# Patient Record
Sex: FEMALE | Race: WHITE
[De-identification: names, ages, dates, MRNs, and addresses within clinical notes are randomized per-mention and may not be internally consistent; named-entity substitution may affect disease eponyms.]

---

## 2018-11-30 ENCOUNTER — HOSPITAL ENCOUNTER (EMERGENCY)
Dept: HOSPITAL 88 - FSED | Age: 73
Discharge: HOME | End: 2018-11-30
Payer: COMMERCIAL

## 2018-11-30 VITALS — WEIGHT: 157 LBS | BODY MASS INDEX: 27.82 KG/M2 | HEIGHT: 63 IN

## 2018-11-30 DIAGNOSIS — R05: ICD-10-CM

## 2018-11-30 DIAGNOSIS — I10: ICD-10-CM

## 2018-11-30 DIAGNOSIS — J18.9: ICD-10-CM

## 2018-11-30 DIAGNOSIS — R50.9: Primary | ICD-10-CM

## 2018-11-30 PROCEDURE — 99284 EMERGENCY DEPT VISIT MOD MDM: CPT

## 2018-11-30 PROCEDURE — 71046 X-RAY EXAM CHEST 2 VIEWS: CPT

## 2018-11-30 PROCEDURE — 87400 INFLUENZA A/B EACH AG IA: CPT

## 2018-11-30 NOTE — XMS REPORT
Summary of Care: 3/14/18 - 3/14/18

                             Created on: 2089



FRANCESCO NAVARRETE

External Reference #: [id number]

: 1945

Sex: Female



Demographics







                          Address                   [address] [place] [address]  [address]

 

                          Home Phone                [phone]

 

                          Preferred Language        English

 

                          Marital Status            

 

                          Evangelical Affiliation     None

 

                          Race                      White

 

                                        Additional Race(s)  

 

                          Ethnic Group              Non-





Author







                          Author                    [facility]

 

                          Organization              [facility]

 

                          Address                   Unknown

 

                          Phone                     Unavailable







Encounter





WILY Hoyos(CAYDEN) [id number] Date(s): 3/14/18 - 3/14/18

[place] [address] [facility], [address] [place] 
[address] [phone]

Discharge Disposition: Home or Self Care

Attending Physician: Marizol Kunz MD





Vital Signs







 



                           Most recent to            1



                                         oldest [Reference 



                                         Range]: 

 

 



                           Height                    160.02 cm



                                         (3/14/18 2:09 PM)

 

 



                           Temperature Oral          98.2 DegF



                           [96.4-99.1 DegF]          (3/14/18 2:09 PM)

 

 



                           Blood Pressure            160/74 mmHg



                           [/60-90 mmHg]       *HI*



                                         (3/14/18 2:09 PM)

 

 



                           Peripheral Pulse          78 bpm



                           Rate [ bpm]         (3/14/18 2:09 PM)

 

 



                           Weight                    71.563 kg



                                         (3/14/18 2:09 PM)

 

 



                           Body Mass Index           27.95 m2



                                         (3/14/18 2:09 PM)







Problem List







    



              Condition     Effective Dates     Status       Health Status     Informant

 

    



                     Acute bronchitis1, 2     08            Resolved  

 

    



                     Acute maxillary     08            Resolved  



                                         sinusitis3, 4    

 

    



                     Acute otitis media5,     13              Resolved  



                                         6    

 

    



                     Acute sinusitis7, 8     9/3/13              Resolved  

 

    



                     Acute upper         2/3/12              Resolved  



                                         respiratory    



                                         infection9, 10    

 

    



                     Allergic finccnlx75     13              Active  

 

    



                     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  



                                         secnzvnlihux75    

 

    



                     Contact             4/28/15             Active  



                                         wpvmukaxak28, 14    

 

    



                     Hip pain15          13              Active  

 

    



                     Knee pain16         13              Active  

 

    



                     Mammography         08              Active  



                                         wbgiwyid53    

 

    



                     Mixed               1/10/11             Active  



                                         hjyakgbxuupnvz85    

 

    



                     Lmilteyguw65        1/10/11             Active  

 

    



                     Pain in lower limb20     13              Active  

 

    



                     Screening for       7/30/15             Active  



                                         malignant neoplasm    



                                         of zkrowv11    

 

    



                     Vitamin D           7/30/15             Active  



                                         zckihhctsu64    







1Data migrated from [employer] on 7/18/15.



2Data migrated from [employer] on 7/17/15.



3Data migrated from [employer] on 7/18/15.



4Data migrated from [employer] on 7/17/15.



5Data migrated from [employer] on 7/18/15.



6Data migrated from [employer] on 7/17/15.



7Data migrated from [employer] on 7/18/15.



8Data migrated from [employer] on 7/17/15.



9Data migrated from [employer] on 7/18/15.



10Data migrated from [employer] on 7/17/15.



11Data migrated from [employer] on 5/30/15.



12Data migrated from [employer] on 5/30/15.



13Data migrated from [employer] on 7/8/15.



14Data migrated from [employer] on 7/8/15.



15Data migrated from [employer] on 5/30/15.



16Data migrated from [employer] on 5/30/15.



17Data migrated from [employer] on 5/30/15.



18Data migrated from [employer] on 5/30/15.



19Data migrated from [employer] on 5/30/15.



20Data migrated from [employer] on 5/30/15.



21Data migrated from [employer] on 8/22/15.



22Data migrated from [employer] on 8/22/15.



Allergies, Adverse Reactions, Alerts







   



                 Substance       Reaction        Severity        Status

 

   



                           penicillins1              Active

 

   



                           NKFA                      Active







1Data migrated from [employer] on 7/30/15. Originally documented as PCN. 
HIVES



Medications





atenolol 25 mg oral tablet

25 mg=1 tab, PO, Daily, # 90 tab, 1 Refill(s), [facility] [id number]

Start Date: 3/14/18

Stop Date: 9/10/18

Status: Ordered



Results





No data available for this section



Immunizations





No data available for this section



Procedures





No data available for this section



Social History







 



                           Social History Type       Response

 

 



                           Smoking Status            Never smoker; Ready to change: No; Concerns about tobacco use 

in household:



                                         No; Exposure to Tobacco Smoke None; Cigarette Smoking Last 365 Days No; Reg



                                         Smoking Cessation Counseling No



                                         entered on: 3/14/18







Assessment and Plan





No data available for this section

## 2018-11-30 NOTE — XMS REPORT
Summary of Care: 14 - 14

                             Created on: 10/09/2031



FRANCESCO NAVARRETE

External Reference #: [id number]

: 1945

Sex: Female



Demographics







                          Address                   [address] [place] [address]  [address]

 

                          Home Phone                [phone]

 

                          Preferred Language        English

 

                          Marital Status            

 

                          Christian Affiliation     Unknown

 

                          Race                      White/

 

                          Ethnic Group              Non-





Author







                          Organization              Unknown

 

                          Address                   Unknown

 

                          Phone                     Unavailable







Encounter





HQ Encntr_damaso(CAYDEN) [id number] Date(s): 14 - 14

[facility] [address] [address]

Discharge Disposition: Home

Physician Attending: Dione Villela MD

Physician_Referring: Dione Villela MD





Reason for Visit





SCREENING MAMMO



Problem List





No data available for this section



Allergies, Adverse Reactions, Alerts







   



                 Substance       Reaction        Severity        Status

 

   



                           NKDA                      Active







Medications





No data available for this section



Medications Administered During Your Visit





No data available for this section



Immunizations





No data available for this section

## 2018-11-30 NOTE — DIAGNOSTIC IMAGING REPORT
PROCEDURE:CXR 2 VIEW - HOPD

 

COMPARISON:None.

 

INDICATIONS:cough congestion

 

FINDINGS:The lungs are well-inflated. Mild biapical 

pleural-parenchymal scar. Calcified granuloma in the left midlung. Mild 

prominence of the pulmonary interstitium.

 

No focal airspace consolidation, pleural effusion, or pneumothorax. 

Tortuosity of the thoracic aorta with atherosclerotic calcification. 

Normal heart size. No overt pulmonary edema.

 

No acute osseous abnormality.

 

 

 

CONCLUSION:

 

Mild prominence of the pulmonary interstitium may indicate age-related 

fibrotic changes, though atypical pneumonia is an additional 

consideration in the setting of cough and congestion. 

 

Dictated by:  Mehrdad Valencia M.D. on 11/30/2018 at 10:28     

Electronically approved by:  Mehrdad Valencia M.D. on 11/30/2018 at 10:28

## 2018-11-30 NOTE — XMS REPORT
Summary of Care: 8/28/15 - 8/28/15

                             Created on: 2090



NAVARRETEFRANCESCO

External Reference #: [id number]

: 1945

Sex: Female



Demographics







                          Address                   [address] [place] [address]  [address]

 

                          Home Phone                [phone]

 

                          Preferred Language        English

 

                          Marital Status            

 

                          Scientology Affiliation     None

 

                          Race                      White/

 

                          Ethnic Group              Non-





Author







                          Author                    [facility]

 

                          Organization              [facility]

 

                          Address                   Unknown

 

                          Phone                     Unavailable







Encounter





WILY Hoyos(FIN) [id number] Date(s): 8/28/15 - 8/28/15

[facility] [address] [place] [address]-     [phone]
[phone]

Discharge Disposition: Home

Attending Physician: Edson Darling MD

Referring Physician: Edson Darling MD





Vital Signs





No data available for this section



Problem List







    



              Condition     Effective Dates     Status       Health Status     Informant

 

    



                     Acute bronchitis1, 2     08            Resolved  

 

    



                     Acute maxillary     08            Resolved  



                                         sinusitis3, 4    

 

    



                     Acute otitis media5,     13              Resolved  



                                         6    

 

    



                     Acute sinusitis7, 8     9/3/13              Resolved  

 

    



                     Acute upper         2/3/12              Resolved  



                                         respiratory    



                                         infection9, 10    

 

    



                     Allergic hzfcwvbb34     13              Active  

 

    



                     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  



                                         kakdgasnsvrm94    

 

    



                     Contact             4/28/15             Active  



                                         onmdddfpfn66, 14    

 

    



                     Hip pain15          13              Active  

 

    



                     Knee pain16         13              Active  

 

    



                     Mammography         08              Active  



                                         nrxrbunj18    

 

    



                     Mixed               1/10/11             Active  



                                         pgbusxplcncyic10    

 

    



                     Szfktsfdpg34        1/10/11             Active  

 

    



                     Pain in lower limb20     13              Active  

 

    



                     Screening for       7/30/15             Active  



                                         malignant neoplasm    



                                         of nkfvfk22    

 

    



                     Vitamin D           7/30/15             Active  



                                         iarpfulkos58    







1Data migrated from [employer] on 7/18/15.



2Data migrated from [employer] on 7/17/15.



3Data migrated from [employer] on 7/18/15.



4Data migrated from [employer] on 7/17/15.



5Data migrated from [employer] on 7/18/15.



6Data migrated from [employer] on 7/17/15.



7Data migrated from [employer] on 7/18/15.



8Data migrated from [employer] on 7/17/15.



9Data migrated from [employer] on 7/18/15.



10Data migrated from [employer] on 7/17/15.



11Data migrated from [employer] on 5/30/15.



12Data migrated from [employer] on 5/30/15.



13Data migrated from [employer] on 7/8/15.



14Data migrated from [employer] on 7/8/15.



15Data migrated from [employer] on 5/30/15.



16Data migrated from [employer] on 5/30/15.



17Data migrated from [employer] on 5/30/15.



18Data migrated from [employer] on 5/30/15.



19Data migrated from [employer] on 5/30/15.



20Data migrated from [employer] on 5/30/15.



21Data migrated from [employer] on 8/22/15.



22Data migrated from [employer] on 8/22/15.



Allergies, Adverse Reactions, Alerts







   



                 Substance       Reaction        Severity        Status

 

   



                           NKDA                      Active

 

   



                           penicillins1              Active







1Data migrated from [employer] on 7/30/15. Originally documented as PCN. 
HIVES



Medications





No data available for this section



Results





No data available for this section



Immunizations





No data available for this section



Procedures





No data available for this section



Social History





No data available for this section



Assessment and Plan





No data available for this section

## 2018-11-30 NOTE — XMS REPORT
Summary of Care: 16 - 16

                             Created on: 2030



NAVARRETEFRANCESCO

External Reference #: [id number]

: 1945

Sex: Female



Demographics







                          Address                   [address] [place]  [address]-

 

                          Home Phone                [phone]

 

                          Preferred Language        English

 

                          Marital Status            

 

                          Restorationist Affiliation     None

 

                          Race                      White/

 

                          Ethnic Group              Non-





Author







                          Author                    [facility]

 

                          Organization              [facility]

 

                          Address                   Unknown

 

                          Phone                     Unavailable







Encounter





WILY Hoyos(CAYDEN) [id number] Date(s): 16 - 16

[facility] [address] [place] [address]-     [phone]
[phone]

Discharge Disposition: Home or Self Care

Attending Physician: Edson Darling MD

Referring Physician: Edson Darling MD





Vital Signs





No data available for this section



Problem List







    



              Condition     Effective Dates     Status       Health Status     Informant

 

    



                     Acute bronchitis1, 2     08            Resolved  

 

    



                     Acute maxillary     08            Resolved  



                                         sinusitis3, 4    

 

    



                     Acute otitis media5,     13              Resolved  



                                         6    

 

    



                     Acute sinusitis7, 8     9/3/13              Resolved  

 

    



                     Acute upper         2/3/12              Resolved  



                                         respiratory    



                                         infection9, 10    

 

    



                     Allergic mionplpx78     13              Active  

 

    



                     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  



                                         krapuiupwxdd44    

 

    



                     Contact             4/28/15             Active  



                                         nqdbcyuxsz44, 14    

 

    



                     Hip pain15          13              Active  

 

    



                     Knee pain16         13              Active  

 

    



                     Mammography         08              Active  



                                         ktcrunpl48    

 

    



                     Mixed               1/10/11             Active  



                                         lmqybinubfoxam18    

 

    



                     Fjccsnvkqp19        1/10/11             Active  

 

    



                     Pain in lower limb20     13              Active  

 

    



                     Screening for       7/30/15             Active  



                                         malignant neoplasm    



                                         of vkwjsu22    

 

    



                     Vitamin D           7/30/15             Active  



                                         wntwjocmnu77    







1Data migrated from [employer] on 7/18/15.



2Data migrated from [employer] on 7/17/15.



3Data migrated from [employer] on 7/18/15.



4Data migrated from [employer] on 7/17/15.



5Data migrated from [employer] on 7/18/15.



6Data migrated from [employer] on 7/17/15.



7Data migrated from [employer] on 7/18/15.



8Data migrated from [employer] on 7/17/15.



9Data migrated from [employer] on 7/18/15.



10Data migrated from [employer] on 7/17/15.



11Data migrated from [employer] on 5/30/15.



12Data migrated from [employer] on 5/30/15.



13Data migrated from [employer] on 7/8/15.



14Data migrated from [employer] on 7/8/15.



15Data migrated from [employer] on 5/30/15.



16Data migrated from [employer] on 5/30/15.



17Data migrated from [employer] on 5/30/15.



18Data migrated from [employer] on 5/30/15.



19Data migrated from [employer] on 5/30/15.



20Data migrated from [employer] on 5/30/15.



21Data migrated from [employer] on 8/22/15.



22Data migrated from [employer] on 8/22/15.



Allergies, Adverse Reactions, Alerts







   



                 Substance       Reaction        Severity        Status

 

   



                           NKDA                      Active

 

   



                           penicillins1              Active







1Data migrated from [employer] on 7/30/15. Originally documented as PCN. 
HIVES



Medications





No data available for this section



Results





No data available for this section



Immunizations





No data available for this section



Procedures





No data available for this section



Social History







 



                           Social History Type       Response

 

 



                           Smoking Status            Never smoker; Exposure to Tobacco Smoke None; Cigarette Smoking

 Last 365



                                         Days No; Reg Smoking Cessation Counseling No







Assessment and Plan





No data available for this section

## 2018-11-30 NOTE — XMS REPORT
Summary of Care: 17 - 17

                             Created on: 2038



FRANCESCO NAVARRETE

External Reference #: [id number]

: 1945

Sex: Female



Demographics







                          Address                   [address] [place]  [address]-

 

                          Home Phone                [phone]

 

                          Preferred Language        English

 

                          Marital Status            

 

                          Shinto Affiliation     None

 

                          Race                      White/

 

                          Ethnic Group              Non-





Author







                          Author                    [facility]

 

                          Organization              [facility]

 

                          Address                   Unknown

 

                          Phone                     Unavailable







Encounter





WILY Hoyos(CAYDEN) [id number] Date(s): 17 - 17

[facility] [address] [place] [address]-     [phone]
[phone]

Discharge Disposition: Home or Self Care

Attending Physician: Marizol Kunz MD

Referring Physician: Marizol Kunz MD





Vital Signs





No data available for this section



Problem List







    



              Condition     Effective Dates     Status       Health Status     Informant

 

    



                     Acute bronchitis1, 2     08            Resolved  

 

    



                     Acute maxillary     08            Resolved  



                                         sinusitis3, 4    

 

    



                     Acute otitis media5,     13              Resolved  



                                         6    

 

    



                     Acute sinusitis7, 8     9/3/13              Resolved  

 

    



                     Acute upper         2/3/12              Resolved  



                                         respiratory    



                                         infection9, 10    

 

    



                     Allergic dhnssbou45     13              Active  

 

    



                     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  



                                         [id number]    

 

    



                     Contact             4/28/15             Active  



                                         eiwnrtmpay51, 14    

 

    



                     Hip pain15          13              Active  

 

    



                     Knee pain16         13              Active  

 

    



                     Mammography         08              Active  



                                         xllhwcty55    

 

    



                     Mixed               1/10/11             Active  



                                         tasnscwrfdfdow62    

 

    



                     Xeeedmxkru83        1/10/11             Active  

 

    



                     Pain in lower limb20     13              Active  

 

    



                     Screening for       7/30/15             Active  



                                         malignant neoplasm    



                                         of hshjid75    

 

    



                     Vitamin D           7/30/15             Active  



                                         uogkoutbii23    







1Data migrated from [employer] on 7/18/15.



2Data migrated from [employer] on 7/17/15.



3Data migrated from [employer] on 7/18/15.



4Data migrated from [employer] on 7/17/15.



5Data migrated from [employer] on 7/18/15.



6Data migrated from [employer] on 7/17/15.



7Data migrated from [employer] on 7/18/15.



8Data migrated from [employer] on 7/17/15.



9Data migrated from [employer] on 7/18/15.



10Data migrated from [employer] on 7/17/15.



11Data migrated from [employer] on 5/30/15.



12Data migrated from [employer] on 5/30/15.



13Data migrated from [employer] on 7/8/15.



14Data migrated from [employer] on 7/8/15.



15Data migrated from [employer] on 5/30/15.



16Data migrated from [employer] on 5/30/15.



17Data migrated from [employer] on 5/30/15.



18Data migrated from [employer] on 5/30/15.



19Data migrated from [employer] on 5/30/15.



20Data migrated from [employer] on 5/30/15.



21Data migrated from [employer] on 8/22/15.



22Data migrated from [employer] on 8/22/15.



Allergies, Adverse Reactions, Alerts







   



                 Substance       Reaction        Severity        Status

 

   



                           NKFA                      Active

 

   



                           penicillins1              Active







1Data migrated from [employer] on 7/30/15. Originally documented as PCN. 
HIVES



Medications





No data available for this section



Results





No data available for this section



Immunizations





No data available for this section



Procedures





No data available for this section



Social History







 



                           Social History Type       Response

 

 



                           Smoking Status            Never smoker; Exposure to Tobacco Smoke None; Cigarette Smoking

 Last 365



                                         Days No; Reg Smoking Cessation Counseling No







Assessment and Plan





No data available for this section

## 2018-11-30 NOTE — XMS REPORT
Continuity of Care Document

                             Created on: 10/17/2018



FRANCESCO NAVARRETE

External Reference #: [id number]

: 1945

Sex: Female



Demographics







                          Address                   [address] [place] [address]  [id number]

 

                          Home Phone                [phone]

 

                          Preferred Language        Unknown

 

                          Marital Status            Unknown

 

                          Scientology Affiliation     Unknown

 

                          Race                      Unknown

 

                          Ethnic Group              Unknown





Author







                          Author                    [facility]              Interface

 

                          Address                   Unknown

 

                          Phone                     Unavailable



                                                    



Problems

                    





                    Problem                            Status                            Onset Date     

                          Classification                            Date Reported       

                          Comments                            Source                    

 

                    ROUTINE                            Active                            10/16/2017     

                                                                                       

                                        [facility]                    

 

                          SCREENING  **LAST MMG W-**                            Active                    

                    2016                                                                     

                                                      [facility]                    

 

                    SCREENING/ OSTEPENIA                            Active                            2015

                                                                                       

                                        [facility]                    

 

                          Vitamin D deficiency<sup>22</sup>                            Active               

                    2015                            Problem                            2018

                                        Data migrated from [employer] on 8/22/15.           

                                        [facility]                    

 

                          Contact dermatitis<sup>13, 14</sup>                            Active             

                          2015                            Problem                         

                          2018                            Data migrated from [employer] on 7/8/15. 

                                        [facility]                    

 

                    SCREENING MAMMO                            Active                            2014

                                                                                       

                                        [facility]                    

 

                          Acute sinusitis<sup>7, 8</sup>                            Resolved                

                    2013                            Problem                            2018

                                        Data migrated from [employer] on 7/17/15.           

                                        [facility]                    

 

                    Hip pain<sup>15</sup>                            Active                            2013

                            Problem                            2018            

                                        Data migrated from [employer] on 5/30/15.                       

                                        [facility]                    

 

                    Knee pain<sup>16</sup>                            Active                            

2013                            Problem                            2018  

                                        Data migrated from [employer] on 5/30/15.             

                                        [facility]                    

 

                          Pain in lower limb<sup>20</sup>                            Active                 

                    2013                            Problem                            2018

                                        Data migrated from [employer] on 5/30/15.           

                                        [facility]                    

 

                          Acute otitis media<sup>5, 6</sup>                            Resolved             

                          2013                            Problem                         

                          2018                            Data migrated from [employer] on 7/17/15.

                                        [facility]                    

 

                          Allergic rhinitis<sup>11</sup>                            Active                  

                    2013                            Problem                            2018

                                        Data migrated from [employer] on 5/30/15.           

                                        [facility]                    

 

                          Acute upper respiratory infection<sup>9, 10</sup>                            Resolved

                            2012                            Problem            

                          2018                            Data migrated from [employer]

 on 7/17/15.                            [facility]              

      

 

                          Benign essential hypertension<sup>12</sup>                            Active      

                          01/10/2011                            Problem                  

                          2018                            Data migrated from [employer] on 5/30/15.

                                        [facility]                    

 

                          Mixed hyperlipidemia<sup>18</sup>                            Active               

                    01/10/2011                            Problem                            2018

                                        Data migrated from [employer] on 5/30/15.           

                                        [facility]                    

 

                          Osteopenia<sup>19</sup>                            Active                         

                    01/10/2011                            Problem                            2018 

                                        Data migrated from [employer] on 5/30/15.            

                                        [facility]                    

 

                          Acute bronchitis<sup>1, 2</sup>                            Resolved               

                    2008                            Problem                            2018

                                        Data migrated from [employer] on 7/17/15.           

                                        [facility]                    

 

                          Acute maxillary sinusitis<sup>3, 4</sup>                            Resolved      

                          2008                            Problem                  

                          2018                            Data migrated from [employer] on 7/17/15.

                                        [facility]                    

 

                          Mammography abnormal<sup>17</sup>                            Active               

                    2008                            Problem                            2018

                                        Data migrated from [employer] on 5/30/15.           

                                        [facility]                    

 

                    SCREEN MAMMOGRAM NEC                            Active                              

                                                                                       

                                        [facility]                    

 

                    Datatype(DG1.4)-                            Active                                  

                                                                                       

                                        [facility]                    

 

                          ENCNTR SCREEN MAMMOGRAM FOR MALIGNANT NE                            Active        

                                                                                       

                                                      [facility]                    



                                                                                
                                                                                
                                                                                
                                                                                
                                                                                
                   



Medications

                    





                    Medication                            Details                            Route      

                          Status                            Patient Instructions         

                          Ordering Provider                            Order Date           

                                        Source                    

 

                          Atenolol 25 MG Oral Tablet                            25 mg=1 tab, PO, Daily, # 90

 tab, 1 Refill(s), Pharmacy: [id number]                                  

                      Active                                                           

                          2018                            [facility]      

              



                                                                                
       



Allergies, Adverse Reactions, Alerts

                    





                    Substance                            Category                            Reaction   

                          Severity                            Reaction type           

                          Status                            Date Reported                     

                          Comments                            Source                    

 

                          penicillins<sup>1</sup>                            Assertion                      

                                                                            Drug allergy             

                    Active                            2007                            

Data migrated from [employer] on 7/30/15. Originally documented as PCN. HIVES
                                        [facility]                    

 

                    NKFA                            Assertion                                           

                                                Drug allergy                            Active

                                                                                     

[facility]                    



                                                                                



Immunizations

                    





                    Immunization                            Date Given                            Site  

                          Status                            Last Updated             

                          Comments                            Source                    



                                                                        



Results

                    





                    Order Name                            Results                            Value      

                          Reference Range                            Date                

                          Interpretation                            Comments                       

                                        Source                    

 

                          Breast Mammo Scrn SUPA w alexia incl CAD MA                            Breast Mammo Scrn

 SUPA w alexia incl CAD MA                         





BILATERAL DIGITAL SCREENING MAMMOGRAM 3D/2D WITH CAD: 2017





CLINICAL: /[employer].  





Current study was evaluated with a Computer Aided Detection (CAD) system.  



COMPARISON:Comparison is made to exams dated:  2016 mammogram, 2015 
mammogram, 2014 mammogram, 2013 mammogram, and 2013 mammogram - 
[facility].   



TECHNIQUE: Digital Breast Tomosynthesis was performed and utilized for 
Interpretation. Molecule Syntha Version 1.3 was utilized for computer aided detection.  
There are scattered fibroglandular densities in both breasts.  



FINDINGS: 

There are benign appearing vascular calcifications and calcifications in the 
right breast.  There also are benign appearing vascular calcifications, 
calcifications, and densities in the left breast.  

No significant masses, calcifications, or other findings are seen in either 
breast.  

There has been no significant interval change.





IMPRESSION: BENIGN



RECOMMENDATION:There is no mammographic evidence of malignancy. A 1 year 
screening mammogram is recommended.(2018)   This exam was interpreted at 
[id number] for [facility].  



Lauro flood/penrad:2017 15:56:01  



Imaging Technologist(s): Barbara Villeda [facility]

letter sent: BI-RADS 1/2  

Mammogram BI-RADS: 2 Benign

                                                          2017                 

                                                      -

                                        -





Read by:  Lauro Crouch MD

Dictated Date/time:  17 15:56

Electronically Signed by:  Lauro Crouch MD                           17 
15:56

FINAL REPORT

                                        [facility]                    

 

                          Digital Mammo Screen Supa MA w alexia                            Digital Mammo Screen

 Supa MA w alexia                             - DIGITAL MAMMO SCREEN SUPA MA W ALEXIA

BILATERAL DIGITAL SCREENING MAMMOGRAM 3D/2D WITH CAD: 2016

CLINICAL: Screen.  



                                        2D digital mammographic images and 3D digital tomosynthesis images were obtained

 in the CC and MLO projections.  

Current study was evaluated with a Computer Aided Detection (CAD) system.  

Comparison is made to exams dated:  2015 mammogram, 2014 mammogram, 
2013 mammogram, 2013 mammogram, 3/21/2011 mammogram and 2010 
mammogram - [facility].  

There are scattered fibroglandular densities in both breasts.  

There are benign appearing vascular calcifications and calcifications in both 
breasts.  There also are benign appearing densities in the left breast.  

No significant masses, calcifications, or other findings are seen in either 
breast.  

There has been no significant interval change.



IMPRESSION: BENIGN

There is no mammographic evidence of malignancy.  A 1 year screening mammogram 
is recommended. 



Lauro flood/penrad:2016 15:02:53  



Imaging Technologist: Sagrario Page, [facility]

This exam was dictated and interpreted by [id number] for [facility].  

letter sent: Normal exam  

Mammogram BI-RADS: 2 Benign

                                                          2016                 

                                                      -

                                        -





Read by:  Lauro Crouch MD

Dictated Date/time:  16 15:02

Electronically Signed by:  Lauro Crouch MD                           16 
15:02

FINAL REPORT

                                        [facility]                    

 

                          Bone Density Scan                            Bone Density Scan                    

                                        DEXA BONE DENSITY

 

TECHNIQUE: DEXA bone density evaluation was performed on a Hologic scanner. This
represents the patient's initial evaluation at this facility.

 

FINDINGS:

 

Total lumbar spine bone density is 0.912 g/sq cm, 1.2 standard deviations below 
normal (T score -1.2). This represents osteopenia.

 

Total hip bone density is 0.996 g/sq cm, 0.4 standard deviations above normal (T
score 0.4).  Bone mineral density of the left femoral neck is 0.727 g/sq cm, 1.1
standard deviations below normal (T score -1.1). This represents osteopenia. 

 

IMPRESSION:

 

Bone mineral density is consistent with osteopenia and increased fracture risk.

 

FOR YOUR INFORMATION:

 

The World Health Organization has established that OSTEOPOROSIS occurs at -2.5 
or more standard deviations (SD) below peak bone mass (T-score on the Hologic 
report). OSTEOPENIA (low bone mass) occurs between -1.0 to -2.5 standard 
deviations below peak bone mass.

 

 

 

 

SL: 16

                                                          2015                 

                                                      -

                                        -





Read by:  Phuc Hawkins MD

Dictated Date/time:  08/28/15 11:45

Electronically Signed by:  Phuc Hawkins MD                        08/28/15 
11:46

FINAL REPORT

                                        [facility]                    

 

                          Digital Mammo Screening Supa MA                            Digital Mammo Screening 

[facility]                                   - DIGITAL MAMMO SCREENING SUPA MA

BILATERAL DIGITAL SCREENING MAMMOGRAM WITH CAD: 2015

CLINICAL: Routine.  



Current study was evaluated with a Computer Aided Detection (CAD) system.  

Comparison is made to exams dated:  2014 mammogram, 2013 mammogram, 
2013 mammogram, 3/21/2011 mammogram, 2010 mammogram and 3/24/2010 
mammogram - [facility].  

There are scattered fibroglandular densities in both breasts.  

There are benign appearing vascular calcifications and calcifications in both 
breasts.  There also are benign appearing densities in the left breast.  

No significant masses, calcifications, or other findings are seen in either 
breast.  

There has been no significant interval change.



IMPRESSION: BENIGN

There is no mammographic evidence of malignancy.  A 1 year screening mammogram 
is recommended. 



Lauro flood/bonny:2015 07:53:35  



Imaging Technologist: Nanci Abdalla, [facility]

This exam was dictated and interpreted by [id number] for [facility].  

letter sent: Normal exam  

Mammogram BI-RADS: 2 Benign

                                                          2015                 

                                                      -

                                        -





Read by:  Lauro Crouch MD

Dictated Date/time:  08/31/15 07:53

Electronically Signed by:  Lauro Crouch MD                           08/31/15 
07:53

FINAL REPORT

                                        [facility]                    

 

                          Digital Mammo Screening Supa MA                            Digital Mammo Screening 

[facility]                                   - DIGITAL MAMMO SCREENING SUPA MA

BILATERAL DIGITAL SCREENING MAMMOGRAM WITH CAD: 2014

CLINICAL: Other Screening Mammogram.  



Current study was evaluated with a Computer Aided Detection (CAD) system.  

Comparison is made to exams dated:  2013 mammogram, 2013 mammogram, 
3/21/2011 mammogram, 2010 mammogram, 3/24/2010 mammogram and 3/20/2009 
mammogram - [facility].  

There are scattered fibroglandular densities in both breasts.  

Multiple benign appearing densities are noted bilaterally which are not 
significantly changed in size possibly representing cysts.  

There are benign appearing vascular calcifications and calcifications in both 
breasts.  

No significant masses, calcifications, or other findings are seen in either 
breast.  

There has been no significant interval change.



IMPRESSION: BENIGN

Multiple benign appearing bilateral densities are not significantly changed 
possibly representing cysts. Ultrasound can be performed for confirmation and to
exclude underlying pathology.  

There is no mammographic evidence of malignancy.  A screening mammogram in one 
year is recommended. 



Lauro flood/bonny:2014 07:50:53  



Imaging Technologist: Nanci Abdalla, [facility]

This exam was dictated and interpreted by [id number] for [facility].  

letter sent: Normal exam  

Mammogram BI-RADS: 2 Benign

                                                          2014                 

                                                      -

                                        -





Read by:  Lauro Crouch MD

Dictated Date/time:  14 07:50

Electronically Signed by:  Lauro Crouch MD                           14 
07:50

FINAL REPORT

                                        [facility]                    



                                                                                
                                                                               



Vital Signs

                    





                    Vital Sign                            Value                            Date         

                          Comments                            Source                    

 

                    BMI Calculated                            27.95                             2018

                                                         [facility]             

       

 

                    Height                            160.02 cm                            2018   

                                                       [facility]                

    

 

                    Heart Rate                            78                             2018     

                                                       [facility]                  

  

 

                    Temperature Oral (F)                            98.2 F                            2018

                                                         [facility]             

       

 

                    Weight                            71.563                             2018     

                                                       [facility]                  

  

 

                    Systolic (mm Hg)                            160                             2018

                                                         [facility]             

       

 

                    Diastolic (mm Hg)                            74                             2018

                                                         [facility]             

       



                                                                                
                                                                                
                      



Encounters

                    





                    Location                            Location Details                            Encounter

 Type                            Encounter Number                            Reason For

 Visit                            Attending Provider                            ADM Date

                            DC Date                            Status                

                                        Source                    

 

                          [facility]                                               

                    Outpatient                            [id number]                            

                            Dione FerreraBetzaida                             2014                                                 

                                        [facility]                    

 

                                                                            Outpatient                  

                    [id number]                                                        DAVY DARLING

                             2015                                              

                          [facility]                                               

                    Outpatient                            [id number]                            

                            Davy Darling                             2015                                                    

                                        [facility]                    

 

                                                                            Outpatient                  

                    [id number]                                                        DAVY DARLING

                             2016                                              

                          [facility]                                               

                    Outpatient                            [id number]                            

                            Davy Darling                             2016                                                    

                                        [facility]                    

 

                                                                            Outpatient                  

                    [id number]                                                        MARIA REYES

                             2017                                              

                          [facility]                    

 

                                                                            Outpatient                  

                    [id number]                                                        TIERNEY ORTEGA-

[facility]                             2017                                        

                          [facility]                                               

                    Outpatient                            [id number]                            

                            Tierney Ortega-Dupont                             2017                                               

                                        [facility]                    

 

                                                                            Outpatient                  

                    [id number]                                                        TIERNEY ORTEGA-

[facility]                             2018                                        

                          [facility]                                                

                    Outpatient                            [id number]                             

                           Tierney Ortega-Dupont                             2018

                            03/15/2018                                               

                                         Medical Group                    

 

                                                                            Outpatient                  

                    [id number]                                                        [facility]

                             2018                                              

                          Active                            [facility]                    

 

                                                                            Outpatient                  

                    [id number]                                                        DAVID [facility]

                             10/17/2018                                              

                          Active                            [facility]                    



                                                                                
                                                                                
                                                      



Procedures

                    





                    Procedure                            Code                            Date           

                          Perfomer                            Comments                        

                                        Source

## 2021-11-01 LAB
BASOPHILS # BLD AUTO: 0.1 10*3/UL (ref 0–0.1)
BASOPHILS NFR BLD AUTO: 1.3 % (ref 0–1)
DEPRECATED NEUTROPHILS # BLD AUTO: 4.1 10*3/UL (ref 2.1–6.9)
EOSINOPHIL # BLD AUTO: 0.2 10*3/UL (ref 0–0.4)
EOSINOPHIL NFR BLD AUTO: 3 % (ref 0–6)
ERYTHROCYTE [DISTWIDTH] IN CORD BLOOD: 12.2 % (ref 11.7–14.4)
HCT VFR BLD AUTO: 42.4 % (ref 34.2–44.1)
HGB BLD-MCNC: 14.3 G/DL (ref 12–16)
LYMPHOCYTES # BLD: 1.7 10*3/UL (ref 1–3.2)
LYMPHOCYTES NFR BLD AUTO: 25.6 % (ref 18–39.1)
MCH RBC QN AUTO: 30.2 PG (ref 28–32)
MCHC RBC AUTO-ENTMCNC: 33.7 G/DL (ref 31–35)
MCV RBC AUTO: 89.6 FL (ref 81–99)
MONOCYTES # BLD AUTO: 0.6 10*3/UL (ref 0.2–0.8)
MONOCYTES NFR BLD AUTO: 8.6 % (ref 4.4–11.3)
NEUTS SEG NFR BLD AUTO: 61.1 % (ref 38.7–80)
PLATELET # BLD AUTO: 199 X10E3/UL (ref 140–360)
RBC # BLD AUTO: 4.73 X10E6/UL (ref 3.6–5.1)

## 2021-11-02 ENCOUNTER — HOSPITAL ENCOUNTER (OUTPATIENT)
Dept: HOSPITAL 88 - OR | Age: 76
Discharge: HOME | End: 2021-11-02
Attending: OPHTHALMOLOGY
Payer: COMMERCIAL

## 2021-11-02 VITALS — DIASTOLIC BLOOD PRESSURE: 74 MMHG | SYSTOLIC BLOOD PRESSURE: 146 MMHG

## 2021-11-02 DIAGNOSIS — I10: ICD-10-CM

## 2021-11-02 DIAGNOSIS — Z01.812: ICD-10-CM

## 2021-11-02 DIAGNOSIS — H25.12: Primary | ICD-10-CM

## 2021-11-02 DIAGNOSIS — Z20.822: ICD-10-CM

## 2021-11-02 PROCEDURE — 66984 XCAPSL CTRC RMVL W/O ECP: CPT

## 2021-11-02 PROCEDURE — 85025 COMPLETE CBC W/AUTO DIFF WBC: CPT

## 2021-11-02 PROCEDURE — 36415 COLL VENOUS BLD VENIPUNCTURE: CPT

## 2021-11-16 ENCOUNTER — HOSPITAL ENCOUNTER (OUTPATIENT)
Dept: HOSPITAL 88 - OR | Age: 76
Discharge: HOME | End: 2021-11-16
Attending: OPHTHALMOLOGY
Payer: COMMERCIAL

## 2021-11-16 VITALS — DIASTOLIC BLOOD PRESSURE: 64 MMHG | SYSTOLIC BLOOD PRESSURE: 98 MMHG

## 2021-11-16 DIAGNOSIS — Z88.0: ICD-10-CM

## 2021-11-16 DIAGNOSIS — I10: ICD-10-CM

## 2021-11-16 DIAGNOSIS — H25.11: Primary | ICD-10-CM

## 2021-11-16 DIAGNOSIS — M19.90: ICD-10-CM

## 2021-11-16 DIAGNOSIS — Z01.812: ICD-10-CM

## 2021-11-16 DIAGNOSIS — I44.0: ICD-10-CM

## 2021-11-16 DIAGNOSIS — E78.5: ICD-10-CM

## 2021-11-16 DIAGNOSIS — Z79.899: ICD-10-CM

## 2021-11-16 DIAGNOSIS — Z20.822: ICD-10-CM

## 2021-11-16 DIAGNOSIS — R01.1: ICD-10-CM

## 2021-11-16 PROCEDURE — 66984 XCAPSL CTRC RMVL W/O ECP: CPT
